# Patient Record
Sex: MALE | Race: WHITE | NOT HISPANIC OR LATINO
[De-identification: names, ages, dates, MRNs, and addresses within clinical notes are randomized per-mention and may not be internally consistent; named-entity substitution may affect disease eponyms.]

---

## 2019-12-23 ENCOUNTER — TRANSCRIBE ORDERS (OUTPATIENT)
Dept: OTHER | Age: 39
End: 2019-12-23

## 2019-12-23 DIAGNOSIS — K31.6 ACQUIRED GASTRIC FISTULA: Primary | ICD-10-CM

## 2020-01-09 ENCOUNTER — DOCUMENTATION ONLY (OUTPATIENT)
Dept: GASTROENTEROLOGY | Facility: CLINIC | Age: 40
End: 2020-01-09

## 2020-01-09 ENCOUNTER — TELEPHONE (OUTPATIENT)
Dept: GASTROENTEROLOGY | Facility: CLINIC | Age: 40
End: 2020-01-09

## 2020-01-09 NOTE — TELEPHONE ENCOUNTER
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 I spoke with the colon and rectal clinic and they stated this would be more GI but to send an encounter over so the nurse can review it and if you can follow up with the patient to schedule. The patient will be on a plane today traveling and his voicemail has not been working so he asked if you can email him maybe at the email on epic with a number to call back to schedule he will call back when he gets the message. Thank you.     Action Taken: Message routed to:  [facility] ([facility] gi

## 2020-01-09 NOTE — PROGRESS NOTES
Action 1/9/2020 4:24pm -Bhao   Action Taken Called and LVM for Pt. Called to inquire information about where Pt had gastric bypass surgery done so the clinic can request for operative report. Left direct call back for Pt to call back.    Fax request sent to [facility] ([phone]) for US Abdomen 9/25/19 and CT Abdomen Pelvis 9/25/19 to be pushed into [facility] PACS.

## 2020-01-10 NOTE — TELEPHONE ENCOUNTER
Patient was called by  Jerome to get additional records and get appointment with one of the bariatric surgeons.

## 2020-01-22 ENCOUNTER — TELEPHONE (OUTPATIENT)
Dept: SURGERY | Facility: CLINIC | Age: 40
End: 2020-01-22

## 2020-01-22 NOTE — TELEPHONE ENCOUNTER
Left VM message stating Dr. Salinas had reviewed all records and would like patient to come in for a consult re: fistula. Various clinic dates and my direct call back number left.

## 2020-01-24 ENCOUNTER — TELEPHONE (OUTPATIENT)
Dept: SURGERY | Facility: CLINIC | Age: 40
End: 2020-01-24

## 2020-01-24 NOTE — TELEPHONE ENCOUNTER
Patient called back requesting 9 a.m. on 1/30 with Dr. Salinas. That slot was taken. Scheduled on 1/30/20 at 9:20 instead. Directions to clinic given, [facility].

## 2020-01-30 ENCOUNTER — OFFICE VISIT (OUTPATIENT)
Dept: SURGERY | Facility: CLINIC | Age: 40
End: 2020-01-30
Payer: COMMERCIAL

## 2020-01-30 VITALS
HEIGHT: 74 IN | SYSTOLIC BLOOD PRESSURE: 127 MMHG | BODY MASS INDEX: 32.84 KG/M2 | OXYGEN SATURATION: 97 % | WEIGHT: 255.9 LBS | HEART RATE: 65 BPM | TEMPERATURE: 98.1 F | DIASTOLIC BLOOD PRESSURE: 70 MMHG

## 2020-01-30 DIAGNOSIS — K31.83 ACHLORHYDRIA, GASTRIC: ICD-10-CM

## 2020-01-30 DIAGNOSIS — K31.6 GASTROGASTRIC FISTULA: Primary | ICD-10-CM

## 2020-01-30 RX ORDER — ERGOCALCIFEROL 1.25 MG/1
CAPSULE, LIQUID FILLED ORAL
COMMUNITY
Start: 2019-11-25

## 2020-01-30 RX ORDER — LANOLIN ALCOHOL/MO/W.PET/CERES
1000 CREAM (GRAM) TOPICAL
COMMUNITY

## 2020-01-30 SDOH — HEALTH STABILITY: MENTAL HEALTH: HOW OFTEN DO YOU HAVE A DRINK CONTAINING ALCOHOL?: NEVER

## 2020-01-30 ASSESSMENT — MIFFLIN-ST. JEOR: SCORE: 2140.5

## 2020-01-30 ASSESSMENT — PAIN SCALES - GENERAL: PAINLEVEL: NO PAIN (0)

## 2020-01-30 NOTE — PATIENT INSTRUCTIONS
"It was a pleasure meeting with you today.    Thank you for allowing us the privilege of caring for you. We hope we provided you with the excellent service you deserve.   Please let us know if there is anything else we can do for you so that we can be sure you are leaving completely satisfied with your care experience.    To ensure the quality of our services you may be receiving a patient satisfaction survey from an independent patient satisfaction monitoring company.    The greatest compliment you can give is a \"Likely to Recommend\"      You saw Dr. Salinas today.     Instructions per today's visit:     You have a gastrogastric staple line breakdown; some surgeons call this a fistula, though it is actually your original pathway.    This is not inherently dangerous; it is NOT a leak, rather the lower stomach is collecting acid and other fluids from the upper stomach.    Without symptoms of current abdominal pain or bitter reflux taste, no need to repair at this time.    If repair warranted, then [facility] and we can coordinate GI to perform closure of the opening endoscopically.    Call [facility] at [phone] for scheduling assistance.      To schedule appointments with our team, please call [phone] option #1    Please call during clinic hours Monday through Friday 8:00a - 4:00p if you have questions or you can contact us via [facility] [employer] at anytime.      Nurses: [phone]  Fax: [phone]  Surgery Scheduler: [phone]    Please call the hospital at [phone] to speak with our on call MDs if you have urgent needs after hours, during weekends, or holidays.    **Please note if you need to go to the Emergency Room for any reason in the first 90 days after surgery it is important to go to the [facility] ER on the [address] on [address] off of the [address] off [address]    *For patients who are currently enrolled in our program and have not yet had weight loss surgery please note*:    You " must be at your required goal weight at the time of your Anesthesia clinic visit as well as the day of surgery or your surgery will be canceled. You will not be able to obtain a new surgery date until you have met your goal weight.    Lab results will be communicated through My Chart or letter (if My Chart not used). Please call the clinic if you have not received communication after 1 week or if you have any questions.?    Interested in working with a health ?  Health coaches work with you to improve your overall health and wellbeing.  They look at the whole person, and may involve discussion of different areas of life, including, but not limited to the four pillars of health (sleep, exercise, nutrition, and stress management). Discuss with your care team if you would like to start working a health .     Health Coaching-3 Pack:      $99 for three health coaching visits    Visits may be done in person or via phone    Coaching is a partnership between the  and the client; Coaches do not prescribe or diagnose    Coaching helps inspire the client to reach his/her personal goals     Main follow-up parameters for all bariatric patients     Patients who have undergone Bariatric surgery:    1. Follow-up interval during the first year: ~1 week, 1 month, 3 month, 6 month,12 months.  Every 6 months until 5 years; and then annually thereafter.  The goal of follow-up sessions is to ensure that food intake behavior (choice of food and eating speed) and exercise/activity level are set appropriately.    2. Yearly lab tests ordered by our clinic.      3. The bariatric team should be aware and potentially evaluate all adverse gastrointestinal symptoms (dysphagia, abdominal pain, nausea, vomiting, diarrhea, heartburn, reflux, etc), which can be a sign of complication.  The bariatric surgeons perform general surgery procedures in addition to bariatric surgery (laparoscopic cholecystectomy, etc.)    4. Inability to  tolerate textured food (chicken, steak, fish, eggs, beans) is NOT normal and may need to be evaluated by endoscopy performed by the bariatric surgeon.

## 2020-01-30 NOTE — NURSING NOTE
"(   Chief Complaint   Patient presents with     Consult     New re-establish care.    )    ( Weight: 116.1 kg (255 lb 14.4 oz) )  ( Height: 188 cm (6' 2\") )  ( BMI (Calculated): 32.86 )  ( Initial Weight: 116.1 kg (255 lb 14.4 oz) )  ( Cumulative weight loss (lbs): 0 )  (   )  (   )  ( Waist Circumference (cm): 117 cm )  (   )    ( BP: 127/70 )  (   )  ( Temp: 98.1  F (36.7  C) )  ( Temp src: Oral )  ( Pulse: 65 )  (   )  ( SpO2: 97 % )    ( There is no problem list on file for this patient.   )  (   Current Outpatient Medications   Medication Sig Dispense Refill     cyanocobalamin (VITAMIN B-12) 1000 MCG tablet Take 1,000 mcg by mouth       Multiple Vitamins-Minerals (MULTIVITAMIN ADULT PO) Take 1 tablet by mouth       vitamin D2 (ERGOCALCIFEROL) 99114 units (1250 mcg) capsule Take twice weekly for 60 days; then use weekly.      )  ( Diabetes Eval:    )    ( Pain Eval:  No Pain (0) )    ( Wound Eval:       )    (   History   Smoking Status     Never Smoker   Smokeless Tobacco     Never Used    )    ( Signed By:  Claudia Mcgovern CMA; January 30, 2020; 9:53 AM )    "

## 2020-01-30 NOTE — LETTER
"     RE: Olvin Bolden  [address] [address]     Dear Colleague,    Thank you for referring your patient, Olvin Bolden, to the [facility] at [facility]. Please see a copy of my visit note below.    New General Surgery Consultation Note     Olvin Bolden  [id number]  1980 January 30, 2020     Requesting Provider: Jad Veliz     CHIEF COMPLAINT:  \"I went to the ER for really bad stomach pain.\"    HISTORY OF PRESENT ILLNESS:  Olvin Bolden is a 39 year old male with PMH of laparoscopic gastric bypass in 2011 at [facility] by Dr. 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     Patient reports that he has had issues with pain ever since his gastric bypass. He had strictures and ulcers immediately following the gastric bypass.     He has overall had periodic epigastric pain, but says that he usually just \"dealt with it\" using TUMS and omeprazole. This September, he had the same epigastric \"ulcer-like\" pain, but it was much more severe than before. He states that he had to go to the ER because he was in so much pain that he could not sit still. Patient describes the pain as a \"shooting pain\" that radiated to his back. In the ER, he had extensive workup leading to an endoscopy that showed \"3 proximal gastric ulcers with no evidence of bleeding\" and a CT that showed \"possible fistula\" between gastric pouch and remnant.     He was given medication for the ulcers and told to come back in a few months for a repeat endoscopy. In October, he had another endoscopy showing that the ulcers had healed. He says that he didn't have the severe pain that brought him to the ER, but he still had epigastric pain that was worse when he ate larger meals. He is mostly able to control the pain with diet and omeprazole, but he is worried he will have the same " pain that brought him into the ER. Patient also reports that in the last 8 months he has gained 25 pounds. He is concerned that the possible fistula is causing his pain and is wondering if it should be repaired.    ASSOCIATED SIGNS/SYMPTOMS:  ROS negative except as noted in HPI    PAST MEDICAL HISTORY:  No past medical history on file.  Class 1 obesity with body mass index (BMI) of 33.0 to 33.9 in adult 11/25/2019   History of Racquel-en-Y gastric bypass 11/25/2019   Postsurgical malabsorption 11/25/2019   Vitamin D deficiency 11/25/2019   Acute gastric ulcer without hemorrhage or perforation 10/03/2019          PAST SURGICAL HISTORY:  No past surgical history on file.     MEDICATIONS:  Current Outpatient Medications   Medication     cyanocobalamin (VITAMIN B-12) 1000 MCG tablet     Multiple Vitamins-Minerals (MULTIVITAMIN ADULT PO)     vitamin D2 (ERGOCALCIFEROL) 49013 units (1250 mcg) capsule     No current facility-administered medications for this visit.         ALLERGIES:  No Known Allergies     SOCIAL HISTORY:  Social History     Socioeconomic History     Marital status:      Spouse name: None     Number of children: None     Years of education: None     Highest education level: None   Occupational History     None   Social Needs     Financial resource strain: None     Food insecurity:     Worry: None     Inability: None     Transportation needs:     Medical: None     Non-medical: None   Tobacco Use     Smoking status: Never Smoker     Smokeless tobacco: Never Used   Substance and Sexual Activity     Alcohol use: Never     Frequency: Never     Drug use: Never     Sexual activity: None   Lifestyle     Physical activity:     Days per week: None     Minutes per session: None     Stress: None   Relationships     Social connections:     Talks on phone: None     Gets together: None     Attends Sabianism service: None     Active member of club or organization: None     Attends meetings of clubs or organizations:  "None     Relationship status: None     Intimate partner violence:     Fear of current or ex partner: None     Emotionally abused: None     Physically abused: None     Forced sexual activity: None   Other Topics Concern     None   Social History Narrative     None       FAMILY HISTORY:  No family history on file.     PHYSICAL EXAM:  Vital Signs: /70 (BP Location: Left arm, Patient Position: Sitting, Cuff Size: Adult Large)   Pulse 65   Temp 98.1  F (36.7  C) (Oral)   Ht 1.88 m (6' 2\")   Wt 116.1 kg (255 lb 14.4 oz)   SpO2 97%   BMI 32.86 kg/m     HEENT: NCAT; MMM;   Lungs: Breathing unlabored  Abdomen: soft, nontender, without hepatosplenomegaly or masses     ASSESSMENT:   Olvin Bolden is a 39 year old male with PMH of RYGB in 2011 here for consultation about a possible gastro-gastric fistula seen on CT.     I reviewed with Milton that the fistula is actually just a connection between his upper and lower stomach.  He is currently having no symptoms associated with the fistula (no epigastric abdominal pain, no bitter reflux).  I have a tough time attributing weight gain to the 'fistula'.  He does have psychosocial reasons for weight gain in the past year.    PLAN:   Patient was advised that the gastro-gastric staple line breakdown seen on CT is not inherently dangerous. This is not a \"leak\" but rather a reconnection of the original pathway. Without symptoms of severe abdominal pain or bitter reflux taste, there is no need to repair this breakdown at this time.     I reviewed with Milton the importance of ulcer risk reduction (though he doesn't smoke and doesn't take NSAIDS).  He will likely need to be on lifetime ulcer disease.    Discussed that in the event of another episode of severe pain like the one that brought patient to the ED, this breakdown can be repaired endoscopically. Patient understood and reported that he did not want a procedure at this time.    If repair warranted, will coordinate with " GI to close gastro-gastric staple line breakdown endoscopically.    Scribe Disclosure:   I, Chaya Nova MS4, am serving as a scribe; to document services personally performed by Dr. Salinas- -based on data collection and the provider's statements to me.     Provider Disclosure:  I agree with above History, Review of Systems, Physical exam and Plan.  I have reviewed the content of the documentation and have edited it as needed. I have personally performed the services documented here and the documentation accurately represents those services and the decisions I have made.      Electronically signed by:    Physician Attestation  I, Johnie Salinas, saw and evaluated this patient as part of a shared visit.  I have reviewed and discussed with the advanced practice provider and/or resident their history, physical and plan.    I personally reviewed the vital signs, medications, labs and imaging.    My key history or physical exam findings: prior gastric bypass; has GG fistula.  Not causing symptoms currently; pain and ulcers associated with time that Milton was NOT taking omeprazole therapy.  He will need to take this consistently in the future.    Key management decisions made by me: no repair, will be done by GI to be arranged as outpatient if necessary.    Johnie Salinas MD  Date of Service (when I saw the patient): 1/30/20

## 2020-01-30 NOTE — PROGRESS NOTES
"    New General Surgery Consultation Note        Olvin Bolden  [id number]  1980 January 30, 2020     Requesting Provider: Jad Veliz     CHIEF COMPLAINT:  \"I went to the ER for really bad stomach pain.\"    HISTORY OF PRESENT ILLNESS:  Olvin Bolden is a 39 year old male with PMH of laparoscopic gastric bypass in 2011 at [facility] by Dr. 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     Patient reports that he has had issues with pain ever since his gastric bypass. He had strictures and ulcers immediately following the gastric bypass.     He has overall had periodic epigastric pain, but says that he usually just \"dealt with it\" using TUMS and omeprazole. This September, he had the same epigastric \"ulcer-like\" pain, but it was much more severe than before. He states that he had to go to the ER because he was in so much pain that he could not sit still. Patient describes the pain as a \"shooting pain\" that radiated to his back. In the ER, he had extensive workup leading to an endoscopy that showed \"3 proximal gastric ulcers with no evidence of bleeding\" and a CT that showed \"possible fistula\" between gastric pouch and remnant.     He was given medication for the ulcers and told to come back in a few months for a repeat endoscopy. In October, he had another endoscopy showing that the ulcers had healed. He says that he didn't have the severe pain that brought him to the ER, but he still had epigastric pain that was worse when he ate larger meals. He is mostly able to control the pain with diet and omeprazole, but he is worried he will have the same pain that brought him into the ER. Patient also reports that in the last 8 months he has gained 25 pounds. He is concerned that the possible fistula is causing his pain and is wondering if it should be repaired.    ASSOCIATED SIGNS/SYMPTOMS:  ROS negative except as noted in " HPI    PAST MEDICAL HISTORY:  No past medical history on file.  Class 1 obesity with body mass index (BMI) of 33.0 to 33.9 in adult 11/25/2019   History of Racquel-en-Y gastric bypass 11/25/2019   Postsurgical malabsorption 11/25/2019   Vitamin D deficiency 11/25/2019   Acute gastric ulcer without hemorrhage or perforation 10/03/2019          PAST SURGICAL HISTORY:  No past surgical history on file.     MEDICATIONS:  Current Outpatient Medications   Medication     cyanocobalamin (VITAMIN B-12) 1000 MCG tablet     Multiple Vitamins-Minerals (MULTIVITAMIN ADULT PO)     vitamin D2 (ERGOCALCIFEROL) 26909 units (1250 mcg) capsule     No current facility-administered medications for this visit.         ALLERGIES:  No Known Allergies     SOCIAL HISTORY:  Social History     Socioeconomic History     Marital status:      Spouse name: None     Number of children: None     Years of education: None     Highest education level: None   Occupational History     None   Social Needs     Financial resource strain: None     Food insecurity:     Worry: None     Inability: None     Transportation needs:     Medical: None     Non-medical: None   Tobacco Use     Smoking status: Never Smoker     Smokeless tobacco: Never Used   Substance and Sexual Activity     Alcohol use: Never     Frequency: Never     Drug use: Never     Sexual activity: None   Lifestyle     Physical activity:     Days per week: None     Minutes per session: None     Stress: None   Relationships     Social connections:     Talks on phone: None     Gets together: None     Attends Jehovah's witness service: None     Active member of club or organization: None     Attends meetings of clubs or organizations: None     Relationship status: None     Intimate partner violence:     Fear of current or ex partner: None     Emotionally abused: None     Physically abused: None     Forced sexual activity: None   Other Topics Concern     None   Social History Narrative     None  "      FAMILY HISTORY:  No family history on file.     PHYSICAL EXAM:  Vital Signs: /70 (BP Location: Left arm, Patient Position: Sitting, Cuff Size: Adult Large)   Pulse 65   Temp 98.1  F (36.7  C) (Oral)   Ht 1.88 m (6' 2\")   Wt 116.1 kg (255 lb 14.4 oz)   SpO2 97%   BMI 32.86 kg/m    HEENT: NCAT; MMM;   Lungs: Breathing unlabored  Abdomen: soft, nontender, without hepatosplenomegaly or masses     ASSESSMENT:   Olvin Bolden is a 39 year old male with PMH of RYGB in 2011 here for consultation about a possible gastro-gastric fistula seen on CT.     I reviewed with Imlton that the fistula is actually just a connection between his upper and lower stomach.  He is currently having no symptoms associated with the fistula (no epigastric abdominal pain, no bitter reflux).  I have a tough time attributing weight gain to the 'fistula'.  He does have psychosocial reasons for weight gain in the past year.    PLAN:   Patient was advised that the gastro-gastric staple line breakdown seen on CT is not inherently dangerous. This is not a \"leak\" but rather a reconnection of the original pathway. Without symptoms of severe abdominal pain or bitter reflux taste, there is no need to repair this breakdown at this time.     I reviewed with Milton the importance of ulcer risk reduction (though he doesn't smoke and doesn't take NSAIDS).  He will likely need to be on lifetime ulcer disease.    Discussed that in the event of another episode of severe pain like the one that brought patient to the ED, this breakdown can be repaired endoscopically. Patient understood and reported that he did not want a procedure at this time.    If repair warranted, will coordinate with GI to close gastro-gastric staple line breakdown endoscopically.    Scribe Disclosure:   Chaya KLINE MS4, am serving as a scribe; to document services personally performed by Dr. Salinas- -based on data collection and the provider's statements to me. "     Provider Disclosure:  I agree with above History, Review of Systems, Physical exam and Plan.  I have reviewed the content of the documentation and have edited it as needed. I have personally performed the services documented here and the documentation accurately represents those services and the decisions I have made.      Electronically signed by:    Physician Attestation  I, Johnie Salinas, saw and evaluated this patient as part of a shared visit.  I have reviewed and discussed with the advanced practice provider and/or resident their history, physical and plan.    I personally reviewed the vital signs, medications, labs and imaging.    My key history or physical exam findings: prior gastric bypass; has GG fistula.  Not causing symptoms currently; pain and ulcers associated with time that Milton was NOT taking omeprazole therapy.  He will need to take this consistently in the future.    Key management decisions made by me: no repair, will be done by GI to be arranged as outpatient if necessary.    Johnie Salinas MD  Date of Service (when I saw the patient): 1/30/20